# Patient Record
Sex: FEMALE | Race: WHITE | HISPANIC OR LATINO | Employment: OTHER | URBAN - METROPOLITAN AREA
[De-identification: names, ages, dates, MRNs, and addresses within clinical notes are randomized per-mention and may not be internally consistent; named-entity substitution may affect disease eponyms.]

---

## 2023-02-04 ENCOUNTER — OFFICE VISIT (OUTPATIENT)
Dept: URGENT CARE | Facility: CLINIC | Age: 73
End: 2023-02-04
Payer: MEDICARE

## 2023-02-04 VITALS
TEMPERATURE: 98 F | HEIGHT: 68 IN | OXYGEN SATURATION: 97 % | BODY MASS INDEX: 34.86 KG/M2 | SYSTOLIC BLOOD PRESSURE: 176 MMHG | DIASTOLIC BLOOD PRESSURE: 93 MMHG | WEIGHT: 230 LBS | RESPIRATION RATE: 18 BRPM | HEART RATE: 69 BPM

## 2023-02-04 DIAGNOSIS — K05.10 GINGIVITIS: Primary | ICD-10-CM

## 2023-02-04 DIAGNOSIS — K04.7 TOOTH ABSCESS: ICD-10-CM

## 2023-02-04 PROCEDURE — 99203 PR OFFICE/OUTPT VISIT, NEW, LEVL III, 30-44 MIN: ICD-10-PCS | Mod: ,,, | Performed by: FAMILY MEDICINE

## 2023-02-04 PROCEDURE — 99203 OFFICE O/P NEW LOW 30 MIN: CPT | Mod: ,,, | Performed by: FAMILY MEDICINE

## 2023-02-04 RX ORDER — HYDROCODONE BITARTRATE AND ACETAMINOPHEN 7.5; 325 MG/1; MG/1
TABLET ORAL
Qty: 5 TABLET | Refills: 0 | Status: SHIPPED | OUTPATIENT
Start: 2023-02-04

## 2023-02-04 NOTE — PATIENT INSTRUCTIONS
Discussed the physical finding, condition and course.  Encouraged to monitor the symptoms, topical warm compresses.  Brushing teeth and mouthwash 2 to 3 times a day.  Tylenol every 6 hours as needed for pain, prescription medication mainly at bedtime caution has Tylenol   ER precautions with any acute change in symptoms.  Patient will follow up with dentist on Monday.

## 2023-02-04 NOTE — PROGRESS NOTES
"Subjective:       Patient ID: Crystal Jimenez is a 73 y.o. female.    Vitals:  height is 5' 8" (1.727 m) and weight is 104.3 kg (230 lb). Her temperature is 98.4 °F (36.9 °C). Her blood pressure is 176/93 (abnormal) and her pulse is 69. Her respiration is 18 and oxygen saturation is 97%.     Chief Complaint: Dental Pain (lower rt side tooth pain x 2 days ago - went to dentist on Thursday and prescribed hydrocodone, has been on augmentin since 1/28 for sinus infection )    HPI:  73-year-old female present to clinic with concerns of dental pain right lower jaw since 2 days.   was seen by the dentist on Thursday and currently on metronidazole.  Completed taking Augmentin for sinus infection.  As patient developed infection on antibiotics dentist as prescribed metronidazole.  States completed taking her hydrocodone as she can not tolerate ibuprofen.  Requesting for medication as Tylenol is not helping much.    ROS  :  Constitutional_  No Fever , Body aches, Chills  HENT_As per HPI  Respiratory_no wheezing, no shortness of breath  Cardiovascular_no chest pain  Gastrointestinal_ No vomiting, No diarrhea, No abdominal pain  Musculoskeletal_no joint pain, no joint swelling  Integumentary_no skin rash     Objective:      Physical Exam    General : Alert and Oriented, No apparent distress, afebrile  Neck - supple  HENT : Oropharynx no redness or swelling.  Right lower face appears swollen, palpable swelling.  Right lower jaw gums erythematous swollen tender to palpate.  Teeth feeling present.  Respiratory : Bilateral equal breath sounds, nonlabored respirations  Cardiovascular : Rate, rhythm regular, normal volume pulse, no murmur  Integumentary : Warm, Dry and no rash    Assessment:       1. Gingivitis    2. Tooth abscess          Plan:     Discussed the physical finding, condition and course.  Encouraged to monitor the symptoms, topical warm compresses.  Brushing teeth and mouthwash 2 to 3 times a day.  Tylenol every " 6 hours as needed for pain, prescription medication mainly at bedtime caution has Tylenol   ER precautions with any acute change in symptoms.  Patient will follow up with dentist on Monday.    Gingivitis  -     HYDROcodone-acetaminophen (NORCO) 7.5-325 mg per tablet; 1 Tab orally at bedtime as needed for pain  Dispense: 5 tablet; Refill: 0    Tooth abscess  -     HYDROcodone-acetaminophen (NORCO) 7.5-325 mg per tablet; 1 Tab orally at bedtime as needed for pain  Dispense: 5 tablet; Refill: 0